# Patient Record
Sex: FEMALE | Race: WHITE | Employment: OTHER | URBAN - METROPOLITAN AREA
[De-identification: names, ages, dates, MRNs, and addresses within clinical notes are randomized per-mention and may not be internally consistent; named-entity substitution may affect disease eponyms.]

---

## 2019-07-23 ENCOUNTER — HOSPITAL ENCOUNTER (OUTPATIENT)
Dept: DIABETES SERVICES | Age: 74
Discharge: HOME | End: 2019-07-23
Payer: MEDICARE

## 2019-07-23 DIAGNOSIS — E11.9 DIABETES MELLITUS WITHOUT COMPLICATION (HCC): ICD-10-CM

## 2019-07-23 PROCEDURE — G0109 DIAB MANAGE TRN IND/GROUP: HCPCS | Performed by: DIETITIAN, REGISTERED

## 2019-08-15 ENCOUNTER — HOSPITAL ENCOUNTER (OUTPATIENT)
Dept: DIABETES SERVICES | Age: 74
Discharge: HOME | End: 2019-08-15
Attending: FAMILY MEDICINE
Payer: MEDICARE

## 2019-08-15 DIAGNOSIS — E11.9 DIABETES MELLITUS WITHOUT COMPLICATION (HCC): ICD-10-CM

## 2019-08-15 PROCEDURE — G0109 DIAB MANAGE TRN IND/GROUP: HCPCS | Performed by: DIETITIAN, REGISTERED

## 2019-09-23 ENCOUNTER — HOSPITAL ENCOUNTER (OUTPATIENT)
Dept: DIABETES SERVICES | Age: 74
Discharge: HOME | End: 2019-09-23
Attending: FAMILY MEDICINE
Payer: MEDICARE

## 2019-09-23 DIAGNOSIS — E11.9 DIABETES MELLITUS WITHOUT COMPLICATION (HCC): ICD-10-CM

## 2019-09-23 PROCEDURE — G0109 DIAB MANAGE TRN IND/GROUP: HCPCS | Performed by: DIETITIAN, REGISTERED

## 2023-02-17 ENCOUNTER — HOSPITAL ENCOUNTER (EMERGENCY)
Age: 78
Discharge: HOME | End: 2023-02-17
Attending: EMERGENCY MEDICINE
Payer: MEDICARE

## 2023-02-17 ENCOUNTER — APPOINTMENT (OUTPATIENT)
Dept: CT IMAGING | Age: 78
End: 2023-02-17
Attending: NURSE PRACTITIONER
Payer: MEDICARE

## 2023-02-17 VITALS
HEART RATE: 95 BPM | DIASTOLIC BLOOD PRESSURE: 75 MMHG | HEIGHT: 64 IN | SYSTOLIC BLOOD PRESSURE: 165 MMHG | BODY MASS INDEX: 30.49 KG/M2 | TEMPERATURE: 98 F | RESPIRATION RATE: 18 BRPM | WEIGHT: 178.57 LBS | OXYGEN SATURATION: 98 %

## 2023-02-17 DIAGNOSIS — M46.1 SACROILIITIS (HCC): Primary | ICD-10-CM

## 2023-02-17 PROCEDURE — 99284 EMERGENCY DEPT VISIT MOD MDM: CPT

## 2023-02-17 PROCEDURE — 74011250637 HC RX REV CODE- 250/637: Performed by: NURSE PRACTITIONER

## 2023-02-17 PROCEDURE — 72131 CT LUMBAR SPINE W/O DYE: CPT

## 2023-02-17 PROCEDURE — 74011636637 HC RX REV CODE- 636/637: Performed by: NURSE PRACTITIONER

## 2023-02-17 RX ORDER — PREDNISONE 10 MG/1
TABLET ORAL
Qty: 21 TABLET | Refills: 0 | Status: SHIPPED | OUTPATIENT
Start: 2023-02-17

## 2023-02-17 RX ORDER — CYCLOBENZAPRINE HCL 10 MG
10 TABLET ORAL
Qty: 15 TABLET | Refills: 0 | Status: SHIPPED | OUTPATIENT
Start: 2023-02-17

## 2023-02-17 RX ORDER — CYCLOBENZAPRINE HCL 10 MG
10 TABLET ORAL
Status: COMPLETED | OUTPATIENT
Start: 2023-02-17 | End: 2023-02-17

## 2023-02-17 RX ORDER — PREDNISONE 20 MG/1
60 TABLET ORAL ONCE
Status: COMPLETED | OUTPATIENT
Start: 2023-02-17 | End: 2023-02-17

## 2023-02-17 RX ORDER — TRAMADOL HYDROCHLORIDE 50 MG/1
50 TABLET ORAL
Status: COMPLETED | OUTPATIENT
Start: 2023-02-17 | End: 2023-02-17

## 2023-02-17 RX ADMIN — CYCLOBENZAPRINE 10 MG: 10 TABLET, FILM COATED ORAL at 13:47

## 2023-02-17 RX ADMIN — TRAMADOL HYDROCHLORIDE 50 MG: 50 TABLET ORAL at 14:16

## 2023-02-17 RX ADMIN — PREDNISONE 60 MG: 20 TABLET ORAL at 13:47

## 2023-02-17 NOTE — Clinical Note
[address]  [facility] EMERGENCY DEPT  [address]  [place] [id number] [phone]    Work/School Note    Date: 2/17/2023    To Whom It May concern: Lisseth Braun was seen and treated today in the emergency room by the following provider(s):  Attending Provider: Paul Nolasco MD  Nurse Practitioner: Kin Haynes NP. Lisseth Braun is excused from work/school on 2/17/2023 through 2/19/2023. She is medically clear to return to work/school on 2/20/2023.          Sincerely,          Neel Mcintyre NP

## 2023-02-17 NOTE — ED TRIAGE NOTES
PT has c/o L lower back pain w/ radiation down her leg. She denies injury or trauma or hx of sciatica. She states she has been seeing her PCP r/t arthritis. A&O, HTN.

## 2023-02-17 NOTE — ED PROVIDER NOTES
This is a 69year old female who presents to the emergency room with complaints of lumbar back pain that started on Sunday. States today it started to radiate down her left leg. Is unable to get comfortable, unable to sleep secondary to the pain. States the only relief she has is when she stands. Denies any trauma. Denies any chest pain, shortness of breath, dizziness, nausea or vomiting, fevers or chills. No urinary urgency or frequency, hematuria. No loss of sensation for bowel or bladder, bowel or bladder incontinence. Has taken Aleve without relief of her symptoms. There are no further complaints at this time. No past medical history on file. No past surgical history on file. No family history on file. Social History     Socioeconomic History    Marital status:      Spouse name: Not on file    Number of children: Not on file    Years of education: Not on file    Highest education level: Not on file   Occupational History    Not on file   Tobacco Use    Smoking status: Not on file    Smokeless tobacco: Not on file   Substance and Sexual Activity    Alcohol use: Not on file    Drug use: Not on file    Sexual activity: Not on file   Other Topics Concern    Not on file   Social History Narrative    Not on file     Social Determinants of Health     Financial Resource Strain: Not on file   Food Insecurity: Not on file   Transportation Needs: Not on file   Physical Activity: Not on file   Stress: Not on file   Social Connections: Not on file   Intimate Partner Violence: Not on file   Housing Stability: Not on file         ALLERGIES: Lisinopril, Metformin, and Pravastatin    Review of Systems   Constitutional:  Negative for appetite change, chills, fatigue and fever. HENT:  Negative for congestion, ear discharge, ear pain, sinus pressure, sinus pain and trouble swallowing. Eyes:  Negative for photophobia and redness. Respiratory:  Negative for cough and shortness of breath. Cardiovascular:  Negative for chest pain. Gastrointestinal:  Negative for abdominal distention, abdominal pain, nausea and vomiting. Genitourinary:  Negative for difficulty urinating, frequency and urgency. Musculoskeletal:  Positive for back pain (left with radiation into left leg). Negative for neck pain. Skin:  Negative for color change and wound. Neurological:  Negative for dizziness, speech difficulty and weakness. Hematological:  Does not bruise/bleed easily. Psychiatric/Behavioral:  Positive for sleep disturbance (due to pain). Negative for agitation. The patient is not nervous/anxious. Vitals:    02/17/23 1313 02/17/23 1320 02/17/23 1321   BP: (!) 191/79 (!) 179/71    Pulse: 97     Resp: 18     Temp: 97.9 °F (36.6 °C)     SpO2: 98%  98%   Weight: 81 kg (178 lb 9.2 oz)     Height: 5' 4\" (1.626 m)              Physical Exam  Vitals and nursing note reviewed. Constitutional:       General: She is not in acute distress. Appearance: Normal appearance. She is well-developed. She is not ill-appearing. HENT:      Head: Normocephalic and atraumatic. Right Ear: External ear normal.      Left Ear: External ear normal.      Nose: Nose normal. No congestion. Mouth/Throat:      Mouth: Mucous membranes are moist.   Eyes:      General:         Right eye: No discharge. Left eye: No discharge. Conjunctiva/sclera: Conjunctivae normal.      Pupils: Pupils are equal, round, and reactive to light. Neck:      Vascular: No JVD. Trachea: No tracheal deviation. Cardiovascular:      Rate and Rhythm: Normal rate. Pulses: Normal pulses. Pulmonary:      Effort: Pulmonary effort is normal. No respiratory distress. Abdominal:      General: There is no distension. Tenderness: There is no guarding. Genitourinary:     Comments: Negative urinary urgency or frequency. No hematuria.     Musculoskeletal:         General: Tenderness (pain on palpation to the sacroiliac joint consistent with sacroiliitis) present. No swelling or signs of injury. Normal range of motion. Cervical back: Normal range of motion and neck supple. No tenderness. Skin:     General: Skin is warm and dry. Capillary Refill: Capillary refill takes less than 2 seconds. Coloration: Skin is not pale. Findings: No erythema or rash. Neurological:      General: No focal deficit present. Mental Status: She is alert and oriented to person, place, and time. Motor: No weakness. Coordination: Coordination normal.   Psychiatric:         Mood and Affect: Mood normal.         Behavior: Behavior normal.         Thought Content: Thought content normal.         Judgment: Judgment normal.        Medical Decision Making  Differential diagnosis includes: Degenerative disc disease, lumbar strain, sacroiliitis and others. This is a 71-year-old female who presents to the emergency room with complaints of lumbar back pain radiating down her left leg. Denies any loss of motor or sensation, no bowel or bladder incontinence, no foot drop. After initial assessment the following was completed:    1. Previous notes (external to Emergency room) were reviewed:chart review    2. History was obtained by: patient    3. Chronic medical issues affecting this visit:   No past medical history on file. No past surgical history on file. 4. I ordered the following tests, followed by review of final reads by lab and radiology:  CT scan of the lumbar spine ordered. 5. I considered ordering: Urinalysis    6. Any intervention/ surgery needed: After physical assessment pain was palpated directly on the sacroiliac joint. Concern for lumbar degenerative disc disease with left-sided sciatica as well as sacroiliitis. No loss of sensation for bowel or bladder. Imaging with mild central canal stenosis but with pain on palpation to the SI joint more likely sacroiliitis.  No immediate need for surgical intervention. Muscle relaxants, steroids and lidocaine patches ordered. Monitor blood glucose level at home, patient does state she has glucometer. 7. Social determinants of health: none    8 Final diagnosis: sacroiliitis. Medications prescribed: prednisone, flexeril and topical lidocaine patches    9. Disposition: discharge to home and follow up with PCP. Return to the emergency room with worsening symptoms including loss of sensation for bowel or bladder, bowel or bladder incontinence. Problems Addressed:  Sacroiliitis [facility]): acute illness or injury    Amount and/or Complexity of Data Reviewed  External Data Reviewed: notes. Radiology: ordered. Decision-making details documented in ED Course. Risk  OTC drugs. Prescription drug management. ED Course as of 02/17/23 1523   Fri Feb 17, 2023   1413 Patient crying from pain, one time dose of Tramadol given [KR]      ED Course User Index  [KR] Deny Gamboa NP     Labs Reviewed - No data to display    CT SPINE LUMB WO CONT    Result Date: 2/17/2023  1. No fracture. 2. L4-L5 moderate central spinal canal stenosis. 3:23 PM  Pt has been reexamined. Pt has no new complaints, changes or physical findings. Care plan outlined and precautions discussed. All available results were reviewed with pt. All medications were reviewed with pt. All of pt's questions and concerns were addressed. Pt agrees to F/U as instructed and agrees to return to ED upon further deterioration. Pt is ready to go home. Naomi Kelley NP    Please note that this dictation was completed with [employer], the computer voice recognition software. Quite often unanticipated grammatical, syntax, homophones, and other interpretive errors are inadvertently transcribed by the computer software. Please disregard these errors. Please excuse any errors that have escaped final proofreading. Thank you.     Procedures

## 2023-04-07 ENCOUNTER — APPOINTMENT (OUTPATIENT)
Dept: GENERAL RADIOLOGY | Age: 78
End: 2023-04-07
Attending: STUDENT IN AN ORGANIZED HEALTH CARE EDUCATION/TRAINING PROGRAM
Payer: MEDICARE

## 2023-04-07 ENCOUNTER — APPOINTMENT (OUTPATIENT)
Dept: CT IMAGING | Age: 78
End: 2023-04-07
Attending: STUDENT IN AN ORGANIZED HEALTH CARE EDUCATION/TRAINING PROGRAM
Payer: MEDICARE

## 2023-04-07 ENCOUNTER — HOSPITAL ENCOUNTER (OUTPATIENT)
Age: 78
End: 2023-04-07
Attending: STUDENT IN AN ORGANIZED HEALTH CARE EDUCATION/TRAINING PROGRAM
Payer: MEDICARE

## 2023-04-10 NOTE — ED PROVIDER NOTES
72-year-old female presents to the ED for evaluation of difficulty urinating for 5 days. [facility] and started on a course of Bactrim. States that she has had no relief since starting this medication. She reports having some suprapubic pain. Denies fevers or chills. Has had a cough as well. Urinary Pain   Associated symptoms include frequency and abdominal pain. Past Medical History:   Diagnosis Date    Diabetes ([facility] [address]        History reviewed. No pertinent surgical history. History reviewed. No pertinent family history. Social History     Socioeconomic History    Marital status:      Spouse name: Not on file    Number of children: Not on file    Years of education: Not on file    Highest education level: Not on file   Occupational History    Not on file   Tobacco Use    Smoking status: Never    Smokeless tobacco: Never   Vaping Use    Vaping Use: Never used   Substance and Sexual Activity    Alcohol use: Not Currently    Drug use: Not on file    Sexual activity: Not on file   Other Topics Concern    Not on file   Social History Narrative    Not on file     Social Determinants of Health     Financial Resource Strain: Not on file   Food Insecurity: Not on file   Transportation Needs: Not on file   Physical Activity: Not on file   Stress: Not on file   Social Connections: Not on file   Intimate Partner Violence: Not on file   Housing Stability: Not on file         ALLERGIES: Penicillins    Review of Systems   Constitutional:  Negative for fever. Respiratory:  Positive for cough. Cardiovascular:  Negative for chest pain. Gastrointestinal:  Positive for abdominal pain. Genitourinary:  Positive for dysuria and frequency.      Vitals:    04/07/23 1245 04/07/23 1304 04/07/23 1315 04/07/23 1507   BP: (!) 118/48 (!) 127/50 133/61 (!) 151/62   Pulse:       Resp:       Temp:       SpO2: 95% 95% 96%    Weight:       Height:                Physical Exam  Vitals and nursing note reviewed. Cardiovascular:      Rate and Rhythm: Normal rate and regular rhythm. Pulses: Normal pulses. Heart sounds: Normal heart sounds. No murmur heard. Pulmonary:      Effort: Pulmonary effort is normal.      Breath sounds: Normal breath sounds. Abdominal:      General: Abdomen is flat. Bowel sounds are normal.      Palpations: Abdomen is soft. Tenderness: There is abdominal tenderness (Suprapubic tenderness palpation). Medical Decision Making  Differential diagnosis includes but not limited to UTI, pyelonephritis, electrolyte abnormality, less likely ACS. Well-appearing 69-year-old female with several days of urinary pressure, dysuria,, suprapubic tenderness to palpation. Reassuring vital signs. No elevation white blood cell count, patient has some mild kidney dysfunction, was given a liter of normal saline. Urinalysis shows positive nitrites and leukocyte Esterase moderates 2+ bacteria. No previous urine culture for comparison and sensitivities. Patient was started on a course of Keflex. CT abdomen pelvis without shows no intra-abdominal abnormalities. Chest x-ray was normal.  Cardiac troponin was not elevated. Patient discharged home instruction to follow PCP, return precaution discussed and agreed upon prior to discharge    Amount and/or Complexity of Data Reviewed  Labs: ordered. Radiology: ordered. ECG/medicine tests: ordered. Risk  Prescription drug management. ED Course as of 04/10/23 0707   Fri Apr 07, 2023   1234 EKG performed at 1159 shows normal sinus rhythm, rate 72, normal intervals, no STEMI. [WG]   1322 Creatinine is elevated and has decreased GFR.   Will perform CT without contrast [WG]      ED Course User Index  [WG] Lore Joel, DO       Procedures